# Patient Record
Sex: MALE | URBAN - METROPOLITAN AREA
[De-identification: names, ages, dates, MRNs, and addresses within clinical notes are randomized per-mention and may not be internally consistent; named-entity substitution may affect disease eponyms.]

---

## 2022-06-28 ENCOUNTER — OFFICE VISIT (OUTPATIENT)
Dept: URBAN - METROPOLITAN AREA CLINIC 6 | Facility: CLINIC | Age: 22
End: 2022-06-28
Payer: COMMERCIAL

## 2022-06-28 DIAGNOSIS — H52.13 MYOPIA, BILATERAL: ICD-10-CM

## 2022-06-28 DIAGNOSIS — Z71.1 PERSON ENCOUNTERING HEALTH SERVICE IN WHICH PROBLEM WAS NORMAL STATE: Primary | ICD-10-CM

## 2022-06-28 PROCEDURE — 92014 COMPRE OPH EXAM EST PT 1/>: CPT | Performed by: OPHTHALMOLOGY

## 2022-06-28 ASSESSMENT — INTRAOCULAR PRESSURE
OD: 15
OS: 17

## 2022-06-28 ASSESSMENT — VISUAL ACUITY
OD: 20/20
OS: 20/20

## 2022-06-28 NOTE — IMPRESSION/PLAN
Impression: Person encountering health service in which problem was normal state: Z71.1. Plan: Normal dilated fundus exam performed today. No retinal pathology found.

## 2022-06-28 NOTE — IMPRESSION/PLAN
Impression: Myopia, bilateral: H52.13. Plan: Prescription given today. Dilated examination was performed and was unremarkable.

## 2022-11-03 ENCOUNTER — OFFICE VISIT (OUTPATIENT)
Dept: URBAN - METROPOLITAN AREA CLINIC 6 | Facility: CLINIC | Age: 22
End: 2022-11-03
Payer: COMMERCIAL

## 2022-11-03 DIAGNOSIS — H35.362 DRUSEN (DEGENERATIVE) OF MACULA, LEFT EYE: Primary | ICD-10-CM

## 2022-11-03 PROCEDURE — 92014 COMPRE OPH EXAM EST PT 1/>: CPT | Performed by: OPTOMETRIST

## 2022-11-03 PROCEDURE — 92250 FUNDUS PHOTOGRAPHY W/I&R: CPT | Performed by: OPTOMETRIST

## 2022-11-03 ASSESSMENT — INTRAOCULAR PRESSURE
OS: 18
OD: 17

## 2022-11-03 NOTE — IMPRESSION/PLAN
Impression: Drusen (degenerative) of macula, left eye: H35.362. Plan: maculopathy-ordered FP/mOCT shows 385/573 with decreased DVAcc to 20/80. HAG given to self monitor at home. To Dr. Promise Godoy for an eval and possible treatment.

## 2023-05-12 ENCOUNTER — OFFICE VISIT (OUTPATIENT)
Dept: URBAN - METROPOLITAN AREA CLINIC 6 | Facility: CLINIC | Age: 23
End: 2023-05-12
Payer: MEDICAID

## 2023-05-12 DIAGNOSIS — Z01.01 ENCOUNTER FOR EXAM OF EYES AND VISION W ABNORMAL FINDINGS: Primary | ICD-10-CM

## 2023-05-12 DIAGNOSIS — H35.362 DRUSEN (DEGENERATIVE) OF MACULA, LEFT EYE: ICD-10-CM

## 2023-05-12 DIAGNOSIS — H52.13 MYOPIA, BILATERAL: ICD-10-CM

## 2023-05-12 PROCEDURE — 92015 DETERMINE REFRACTIVE STATE: CPT | Performed by: OPTOMETRIST

## 2023-05-12 PROCEDURE — S0621 ROUTINE OPHTHALMOLOGICAL EXA: HCPCS | Performed by: OPTOMETRIST

## 2023-05-12 ASSESSMENT — VISUAL ACUITY
OD: 20/20
OS: 20/80

## 2023-05-12 ASSESSMENT — INTRAOCULAR PRESSURE
OD: 14
OS: 11

## 2023-05-12 NOTE — IMPRESSION/PLAN
Impression: Drusen (degenerative) of macula, left eye: H35.362. Plan: central drusen enlarged since 11/03/22 as seen on MOCT ordered. BCVA is reduced OS and unable to be improved upon with refraction. Referral to [facility] today. Drusen not present in 2021. Reason for Referral: 

You are being referred to the physicians at       [facility] 	Dr. Marcia Ladd. Ruperto Jackson. Thurcalixto Oro Address: [address] [place] [address]   [phone] 	     [address] [place] [address]  [phone] 	     [place] [address], [address] [place] [address]  [phone] Please contact one of the offices listed above to arrange an appointment. NOTE:  Some insurance providers require a REFERRAL FROM YOUR PRIMARY CARE DOCTOR. Please contact your insurance company in order to see if this is the policy. If so, contact your primary care doctor in order to obtain authorization. If you have any questions, or need any further assistance, please contact our office at [phone].